# Patient Record
Sex: FEMALE | Race: WHITE | NOT HISPANIC OR LATINO | URBAN - METROPOLITAN AREA
[De-identification: names, ages, dates, MRNs, and addresses within clinical notes are randomized per-mention and may not be internally consistent; named-entity substitution may affect disease eponyms.]

---

## 2020-09-04 ENCOUNTER — TELEPHONE ENCOUNTER (OUTPATIENT)
Dept: URBAN - METROPOLITAN AREA CLINIC 92 | Facility: CLINIC | Age: 60
End: 2020-09-04

## 2020-09-14 ENCOUNTER — WEB ENCOUNTER (OUTPATIENT)
Dept: URBAN - NONMETROPOLITAN AREA CLINIC 2 | Facility: CLINIC | Age: 60
End: 2020-09-14

## 2020-09-14 ENCOUNTER — OFFICE VISIT (OUTPATIENT)
Dept: URBAN - NONMETROPOLITAN AREA CLINIC 2 | Facility: CLINIC | Age: 60
End: 2020-09-14
Payer: COMMERCIAL

## 2020-09-14 DIAGNOSIS — Z12.11 COLON CANCER SCREENING: ICD-10-CM

## 2020-09-14 DIAGNOSIS — K57.92 DIVERTICULITIS: ICD-10-CM

## 2020-09-14 PROCEDURE — 3017F COLORECTAL CA SCREEN DOC REV: CPT | Performed by: NURSE PRACTITIONER

## 2020-09-14 PROCEDURE — 1036F TOBACCO NON-USER: CPT | Performed by: NURSE PRACTITIONER

## 2020-09-14 PROCEDURE — 99213 OFFICE O/P EST LOW 20 MIN: CPT | Performed by: NURSE PRACTITIONER

## 2020-09-14 PROCEDURE — G8427 DOCREV CUR MEDS BY ELIG CLIN: HCPCS | Performed by: NURSE PRACTITIONER

## 2020-09-14 RX ORDER — GLUCOSAMINE SULFATE 500 MG
TABLET ORAL
Qty: 0 | Refills: 0 | Status: ACTIVE | COMMUNITY
Start: 1900-01-01

## 2020-09-14 NOTE — HPI-TODAY'S VISIT:
Susi presents for follow-up of diverticulitis and rectal bleeding.  The first week in September she developed lower abdominal pain with pressure and cramping similar to previous episodes of diverticulitis in the past.  She states that her bowels were somewhat loose, and she saw bright red blood when wiping.  She felt like this was a typical diverticulitis flare for her.  She went to see Dr. De Los Santos who prescribed Flagyl and Cipro for 10 days.  She took this for 7 days and was not able to complete the last 3.  Today she is doing better, she has had no further pain.  Her bowels are somewhat normal, with no further bright red blood per rectum.  Her last documented episode of diverticulitis was in 2015 with slight wall thickening of the mid proximal sigmoid colon with subtle stranding of adjacent fat.  Her last colonoscopy was in 2018 by Dr. Kendrick, this reveals left-sided diverticular disease.  Today she is feeling much better, her appetite has returned, her abdominal pain has resolved, and her diet is back to normal.  Otherwise she is doing well with no new GI complaints.  MB

## 2020-10-27 ENCOUNTER — OFFICE VISIT (OUTPATIENT)
Dept: URBAN - NONMETROPOLITAN AREA CLINIC 2 | Facility: CLINIC | Age: 60
End: 2020-10-27

## 2020-12-01 ENCOUNTER — OFFICE VISIT (OUTPATIENT)
Dept: URBAN - NONMETROPOLITAN AREA CLINIC 2 | Facility: CLINIC | Age: 60
End: 2020-12-01
Payer: COMMERCIAL

## 2020-12-01 VITALS
HEIGHT: 63 IN | WEIGHT: 143 LBS | TEMPERATURE: 98.1 F | DIASTOLIC BLOOD PRESSURE: 84 MMHG | BODY MASS INDEX: 25.34 KG/M2 | HEART RATE: 54 BPM | SYSTOLIC BLOOD PRESSURE: 128 MMHG

## 2020-12-01 DIAGNOSIS — Z12.11 COLON CANCER SCREENING: ICD-10-CM

## 2020-12-01 DIAGNOSIS — K57.92 DIVERTICULITIS: ICD-10-CM

## 2020-12-01 PROCEDURE — G8482 FLU IMMUNIZE ORDER/ADMIN: HCPCS | Performed by: NURSE PRACTITIONER

## 2020-12-01 PROCEDURE — 3017F COLORECTAL CA SCREEN DOC REV: CPT | Performed by: NURSE PRACTITIONER

## 2020-12-01 PROCEDURE — 1036F TOBACCO NON-USER: CPT | Performed by: NURSE PRACTITIONER

## 2020-12-01 PROCEDURE — 99213 OFFICE O/P EST LOW 20 MIN: CPT | Performed by: NURSE PRACTITIONER

## 2020-12-01 PROCEDURE — G8420 CALC BMI NORM PARAMETERS: HCPCS | Performed by: NURSE PRACTITIONER

## 2020-12-01 PROCEDURE — G8427 DOCREV CUR MEDS BY ELIG CLIN: HCPCS | Performed by: NURSE PRACTITIONER

## 2020-12-01 RX ORDER — GLUCOSAMINE SULFATE 500 MG
TABLET ORAL
Qty: 0 | Refills: 0 | Status: ACTIVE | COMMUNITY
Start: 1900-01-01

## 2020-12-01 NOTE — HPI-TODAY'S VISIT:
Susi presents for follow-up of diverticulitis and rectal bleeding.  The first week in September she developed lower abdominal pain with pressure and cramping similar to previous episodes of diverticulitis in the past.  She states that her bowels were somewhat loose, and she saw bright red blood when wiping.  She felt like this was a typical diverticulitis flare for her.  She went to see Dr. De Los Santos who prescribed Flagyl and Cipro for 10 days.  She took this for 7 days and was not able to complete the last 3.  Today she is doing better, she has had no further pain.  Her bowels are somewhat normal, with no further bright red blood per rectum.  Her last documented episode of diverticulitis was in 2015 with slight wall thickening of the mid proximal sigmoid colon with subtle stranding of adjacent fat.  Her last colonoscopy was in 2018 by Dr. Kendrick, this reveals left-sided diverticular disease.  Today she is feeling much better, her appetite has returned, her abdominal pain has resolved, and her diet is back to normal.  Otherwise she is doing well with no new GI complaints.  MB   12/1/2020 ARIANNE presents for follow-up of diverticulitis.  She is doing much better after completing her antibiotics.  She is started low-dose psyllium fiber capsules at night with a normal bowel movement daily.  She occasionally has pain and spasm in her left lower quadrant but no further flares.  Her last colonoscopy was in 2018 with diverticulosis.  Today she is doing well otherwise. MB

## 2021-04-27 ENCOUNTER — OFFICE VISIT (OUTPATIENT)
Dept: URBAN - NONMETROPOLITAN AREA CLINIC 2 | Facility: CLINIC | Age: 61
End: 2021-04-27
Payer: COMMERCIAL

## 2021-04-27 DIAGNOSIS — Z12.11 COLON CANCER SCREENING: ICD-10-CM

## 2021-04-27 DIAGNOSIS — K57.92 DIVERTICULITIS: ICD-10-CM

## 2021-04-27 DIAGNOSIS — K59.00 CONSTIPATION, UNSPECIFIED CONSTIPATION TYPE: ICD-10-CM

## 2021-04-27 DIAGNOSIS — R10.32 LLQ ABDOMINAL PAIN: ICD-10-CM

## 2021-04-27 PROCEDURE — 99214 OFFICE O/P EST MOD 30 MIN: CPT | Performed by: INTERNAL MEDICINE

## 2021-04-27 RX ORDER — GLUCOSAMINE SULFATE 500 MG
TABLET ORAL
Qty: 0 | Refills: 0 | Status: ACTIVE | COMMUNITY
Start: 1900-01-01

## 2021-04-27 RX ORDER — HYOSCYAMINE SULFATE 0.12 MG/1
1 TABLET UNDER THE TONGUE AND ALLOW TO DISSOLVE  AS NEEDED TABLET, ORALLY DISINTEGRATING ORAL THREE TIMES A DAY
Qty: 30 TABLET | Refills: 3 | OUTPATIENT
Start: 2021-04-27 | End: 2021-08-25

## 2021-10-26 ENCOUNTER — OFFICE VISIT (OUTPATIENT)
Dept: URBAN - NONMETROPOLITAN AREA CLINIC 2 | Facility: CLINIC | Age: 61
End: 2021-10-26

## 2022-02-25 ENCOUNTER — WEB ENCOUNTER (OUTPATIENT)
Dept: URBAN - NONMETROPOLITAN AREA CLINIC 13 | Facility: CLINIC | Age: 62
End: 2022-02-25

## 2022-02-25 ENCOUNTER — OFFICE VISIT (OUTPATIENT)
Dept: URBAN - NONMETROPOLITAN AREA CLINIC 13 | Facility: CLINIC | Age: 62
End: 2022-02-25
Payer: COMMERCIAL

## 2022-02-25 VITALS
SYSTOLIC BLOOD PRESSURE: 116 MMHG | HEART RATE: 60 BPM | HEIGHT: 63 IN | DIASTOLIC BLOOD PRESSURE: 79 MMHG | WEIGHT: 148.6 LBS | BODY MASS INDEX: 26.33 KG/M2

## 2022-02-25 DIAGNOSIS — K59.00 CONSTIPATION, UNSPECIFIED CONSTIPATION TYPE: ICD-10-CM

## 2022-02-25 DIAGNOSIS — R10.32 LLQ ABDOMINAL PAIN: ICD-10-CM

## 2022-02-25 DIAGNOSIS — Z12.11 COLON CANCER SCREENING: ICD-10-CM

## 2022-02-25 DIAGNOSIS — K57.92 DIVERTICULITIS: ICD-10-CM

## 2022-02-25 PROBLEM — 301716002: Status: ACTIVE | Noted: 2021-04-27

## 2022-02-25 PROCEDURE — 99214 OFFICE O/P EST MOD 30 MIN: CPT | Performed by: INTERNAL MEDICINE

## 2022-02-25 RX ORDER — GLUCOSAMINE SULFATE 500 MG
TABLET ORAL
Qty: 0 | Refills: 0 | Status: ACTIVE | COMMUNITY
Start: 1900-01-01

## 2022-04-29 ENCOUNTER — ERX REFILL RESPONSE (OUTPATIENT)
Dept: URBAN - NONMETROPOLITAN AREA CLINIC 2 | Facility: CLINIC | Age: 62
End: 2022-04-29

## 2022-04-29 RX ORDER — HYOSCYAMINE SULFATE 0.12 MG/1
PLACE 1 TABLET UNDER THE TONGUE AND ALLOW IT TO DISSOLVE AS NEEDED THREE TIMES A DAY TABLET ORAL
Qty: 30 TABLET | Refills: 4 | OUTPATIENT

## 2023-01-17 ENCOUNTER — TELEPHONE ENCOUNTER (OUTPATIENT)
Dept: URBAN - NONMETROPOLITAN AREA CLINIC 2 | Facility: CLINIC | Age: 63
End: 2023-01-17

## 2023-01-17 RX ORDER — GLUCOSAMINE SULFATE 500 MG
TABLET ORAL
Qty: 0 | Refills: 0 | Status: ACTIVE | COMMUNITY
Start: 1900-01-01

## 2023-01-17 RX ORDER — CIPROFLOXACIN HYDROCHLORIDE 500 MG/1
1 TABLET TABLET, FILM COATED ORAL
Qty: 28 TABLET | Refills: 0 | OUTPATIENT
Start: 2023-01-17 | End: 2023-01-31

## 2023-01-17 RX ORDER — METRONIDAZOLE 500 MG/1
1 TABLET TABLET ORAL THREE TIMES A DAY
Qty: 42 TABLET | Refills: 0 | OUTPATIENT
Start: 2023-01-17 | End: 2023-01-31

## 2023-01-17 RX ORDER — HYOSCYAMINE SULFATE 0.12 MG/1
PLACE 1 TABLET UNDER THE TONGUE AND ALLOW IT TO DISSOLVE AS NEEDED THREE TIMES A DAY TABLET ORAL
Qty: 30 TABLET | Refills: 4 | Status: ACTIVE | COMMUNITY

## 2023-01-23 ENCOUNTER — TELEPHONE ENCOUNTER (OUTPATIENT)
Dept: URBAN - NONMETROPOLITAN AREA CLINIC 2 | Facility: CLINIC | Age: 63
End: 2023-01-23

## 2023-01-27 LAB
A/G RATIO: 1.8
ALBUMIN: 4.7
ALKALINE PHOSPHATASE: 70
ALT (SGPT): 12
AST (SGOT): 18
BASO (ABSOLUTE): 0
BASOS: 1
BILIRUBIN, TOTAL: 0.3
BUN/CREATININE RATIO: 18
BUN: 12
C-REACTIVE PROTEIN, QUANT: 7
CALCIUM: 9.7
CARBON DIOXIDE, TOTAL: 21
CHLORIDE: 100
CREATININE: 0.66
EBV AB VCA, IGG: >600
EBV AB VCA, IGM: <36
EBV NUCLEAR ANTIGEN AB, IGG: 93.5
EGFR: 99
EOS (ABSOLUTE): 0.2
EOS: 3
GLOBULIN, TOTAL: 2.6
GLUCOSE: 106
HEMATOCRIT: 46.9
HEMATOLOGY COMMENTS:: (no result)
HEMOGLOBIN: 15.7
IMMATURE CELLS: (no result)
IMMATURE GRANS (ABS): 0
IMMATURE GRANULOCYTES: 0
INTERPRETATION:: (no result)
LYMPHS (ABSOLUTE): 1.1
LYMPHS: 18
MCH: 31.3
MCHC: 33.5
MCV: 93
MONOCYTES(ABSOLUTE): 0.6
MONOCYTES: 10
NEUTROPHILS (ABSOLUTE): 4.2
NEUTROPHILS: 68
NRBC: (no result)
PLATELETS: 218
POTASSIUM: 4.8
PROTEIN, TOTAL: 7.3
RBC: 5.02
RDW: 12.3
SODIUM: 143
WBC: 6.2

## 2023-01-30 ENCOUNTER — TELEPHONE ENCOUNTER (OUTPATIENT)
Dept: URBAN - METROPOLITAN AREA CLINIC 92 | Facility: CLINIC | Age: 63
End: 2023-01-30

## 2023-02-24 ENCOUNTER — OFFICE VISIT (OUTPATIENT)
Dept: URBAN - NONMETROPOLITAN AREA CLINIC 2 | Facility: CLINIC | Age: 63
End: 2023-02-24

## 2023-05-16 ENCOUNTER — WEB ENCOUNTER (OUTPATIENT)
Dept: URBAN - NONMETROPOLITAN AREA CLINIC 2 | Facility: CLINIC | Age: 63
End: 2023-05-16

## 2023-05-16 ENCOUNTER — OFFICE VISIT (OUTPATIENT)
Dept: URBAN - NONMETROPOLITAN AREA CLINIC 2 | Facility: CLINIC | Age: 63
End: 2023-05-16
Payer: COMMERCIAL

## 2023-05-16 VITALS
HEIGHT: 63 IN | WEIGHT: 145 LBS | BODY MASS INDEX: 25.69 KG/M2 | HEART RATE: 66 BPM | DIASTOLIC BLOOD PRESSURE: 93 MMHG | SYSTOLIC BLOOD PRESSURE: 139 MMHG

## 2023-05-16 DIAGNOSIS — Z12.11 COLON CANCER SCREENING: ICD-10-CM

## 2023-05-16 DIAGNOSIS — K59.01 CONSTIPATION: ICD-10-CM

## 2023-05-16 DIAGNOSIS — K57.92 DIVERTICULITIS: ICD-10-CM

## 2023-05-16 DIAGNOSIS — R10.32 LLQ ABDOMINAL PAIN: ICD-10-CM

## 2023-05-16 PROCEDURE — 99214 OFFICE O/P EST MOD 30 MIN: CPT | Performed by: INTERNAL MEDICINE

## 2023-05-16 RX ORDER — GLUCOSAMINE SULFATE 500 MG
TABLET ORAL
Qty: 0 | Refills: 0 | Status: ACTIVE | COMMUNITY
Start: 1900-01-01

## 2023-05-16 RX ORDER — HYOSCYAMINE SULFATE 0.12 MG/1
PLACE 1 TABLET UNDER THE TONGUE AND ALLOW IT TO DISSOLVE AS NEEDED THREE TIMES A DAY TABLET ORAL
Qty: 30 TABLET | Refills: 4 | Status: ACTIVE | COMMUNITY

## 2023-05-16 NOTE — HPI-TODAY'S VISIT:
Susi presents for follow-up of diverticulitis and rectal bleeding.  The first week in September she developed lower abdominal pain with pressure and cramping similar to previous episodes of diverticulitis in the past.  She states that her bowels were somewhat loose, and she saw bright red blood when wiping.  She felt like this was a typical diverticulitis flare for her.  She went to see Dr. De Los Santos who prescribed Flagyl and Cipro for 10 days.  She took this for 7 days and was not able to complete the last 3.  Today she is doing better, she has had no further pain.  Her bowels are somewhat normal, with no further bright red blood per rectum.  Her last documented episode of diverticulitis was in 2015 with slight wall thickening of the mid proximal sigmoid colon with subtle stranding of adjacent fat.  Her last colonoscopy was in 2018 by Dr. Kendrick, this reveals left-sided diverticular disease.  Today she is feeling much better, her appetite has returned, her abdominal pain has resolved, and her diet is back to normal.  Otherwise she is doing well with no new GI complaints.  MB   12/1/2020 ARIANNE presents for follow-up of diverticulitis.  She is doing much better after completing her antibiotics.  She is started low-dose psyllium fiber capsules at night with a normal bowel movement daily.  She occasionally has pain and spasm in her left lower quadrant but no further flares.  Her last colonoscopy was in 2018 with diverticulosis.  Today she is doing well otherwise. MB 4-27-21 The patient presents today for follow-up of left lower quadrant abdominal pain.  She does have a history of diverticulitis.  She feels that over the past several months, she has had 2 flares of left lower quadrant abdominal pain.  She has not been drinking as much water as she has been previously, and she has not been exercising as much.  She does not feel constipated, but she does think her bowels have changed.  When she feels this pain, she goes on a clear liquid diet, and her pain usually goes away.  She has not had fever or chills, and she has not required another episode of antibiotics.  She has not seen any blood in her stool.  We have had a discussion today that this likely represents spasm from her diverticular disease along with possible scar tissue.  She has been taking her psyllium on a daily basis, but she is not taking MiraLAX daily.  We have discussed adding this in on a more regular basis.  She is to call us if she has a flare of what appears to be diverticulitis that requires antibiotics. 2/25/2022 The patient presents today for follow-up of her history of diverticulitis.  Since her last visit, she has been doing quite well.  She does occasionally have some left lower quadrant abdominal pain, but this is controlled with using Levsin or high Cosamin as needed she is taking her bowel regimen with Metamucil and MiraLAX, and her bowels have been normal quite regular.  She has not had any further flares of diverticulitis.  Overall, from a GI standpoint she is doing quite well.  We will see her back in 1 year. 5/16/2023 The patient presents today for follow-up of her diverticulitis and left lower quadrant abdominal pain along with constipation.  Since her last visit, she did have a flare of diverticulitis.  This happened about 3 to 4 months ago.  She started developing left lower quadrant abdominal pain.  She went on a bland diet.  We did call her in antibiotics, but she did not have to take them.  We also checked her labs, and these were all normal.  Today, we have discussed her fiber intake.  She is taking a fiber gummy, but not Metamucil or MiraLAX.  Today, we have discussed switching back to Metamucil and taking MiraLAX as needed.  She feels that she does not need anything extra right now for her bowels.  She has not due for repeat colonoscopy in 2028.  I do want her to come back in 4 to 6 months to make sure she has not had any further flares, and to make sure her bowels have returned to normal.  Otherwise, I would consider doing colonoscopy sooner.  We will see her back in the office in 4 to 6 months.

## 2023-10-08 ENCOUNTER — ERX REFILL RESPONSE (OUTPATIENT)
Dept: URBAN - NONMETROPOLITAN AREA CLINIC 2 | Facility: CLINIC | Age: 63
End: 2023-10-08

## 2023-10-08 RX ORDER — HYOSCYAMINE SULFATE 0.12 MG/1
PLACE 1 TABLET UNDER THE TONGUE AND ALLOW IT TO DISSOLVE AS NEEDED THREE TIMES A DAY TABLET ORAL
Qty: 30 TABLET | Refills: 11 | OUTPATIENT

## 2023-10-08 RX ORDER — HYOSCYAMINE SULFATE 0.12 MG/1
PLACE 1 TABLET UNDER THE TONGUE AND ALLOW IT TO DISSOLVE AS NEEDED THREE TIMES A DAY TABLET ORAL
Qty: 30 TABLET | Refills: 1 | OUTPATIENT

## 2023-10-24 ENCOUNTER — CLAIMS CREATED FROM THE CLAIM WINDOW (OUTPATIENT)
Dept: URBAN - NONMETROPOLITAN AREA CLINIC 2 | Facility: CLINIC | Age: 63
End: 2023-10-24
Payer: COMMERCIAL

## 2023-10-24 ENCOUNTER — DASHBOARD ENCOUNTERS (OUTPATIENT)
Age: 63
End: 2023-10-24

## 2023-10-24 VITALS
WEIGHT: 145 LBS | HEIGHT: 63 IN | HEART RATE: 60 BPM | BODY MASS INDEX: 25.69 KG/M2 | DIASTOLIC BLOOD PRESSURE: 85 MMHG | SYSTOLIC BLOOD PRESSURE: 122 MMHG

## 2023-10-24 DIAGNOSIS — K59.00 CONSTIPATION, UNSPECIFIED CONSTIPATION TYPE: ICD-10-CM

## 2023-10-24 DIAGNOSIS — R10.32 LLQ ABDOMINAL PAIN: ICD-10-CM

## 2023-10-24 DIAGNOSIS — K57.92 DIVERTICULITIS: ICD-10-CM

## 2023-10-24 DIAGNOSIS — Z12.11 COLON CANCER SCREENING: ICD-10-CM

## 2023-10-24 DIAGNOSIS — K59.09 CHANGE IN BOWEL MOVEMENTS INTERMITTENT CONSTIPATION. URGENCY IN THE MORNING.: ICD-10-CM

## 2023-10-24 PROBLEM — 14760008: Status: ACTIVE | Noted: 2021-04-27

## 2023-10-24 PROCEDURE — 99212 OFFICE O/P EST SF 10 MIN: CPT

## 2023-10-24 RX ORDER — GLUCOSAMINE SULFATE 500 MG
TABLET ORAL
Qty: 0 | Refills: 0 | Status: ACTIVE | COMMUNITY
Start: 1900-01-01

## 2023-10-24 RX ORDER — HYOSCYAMINE SULFATE 0.12 MG/1
PLACE 1 TABLET UNDER THE TONGUE AND ALLOW IT TO DISSOLVE AS NEEDED THREE TIMES A DAY TABLET ORAL
Qty: 30 TABLET | Refills: 11 | Status: ACTIVE | COMMUNITY

## 2023-10-24 NOTE — HPI-TODAY'S VISIT:
Ssui presents for follow-up of diverticulitis and rectal bleeding.  The first week in September she developed lower abdominal pain with pressure and cramping similar to previous episodes of diverticulitis in the past.  She states that her bowels were somewhat loose, and she saw bright red blood when wiping.  She felt like this was a typical diverticulitis flare for her.  She went to see Dr. De Los Santos who prescribed Flagyl and Cipro for 10 days.  She took this for 7 days and was not able to complete the last 3.  Today she is doing better, she has had no further pain.  Her bowels are somewhat normal, with no further bright red blood per rectum.  Her last documented episode of diverticulitis was in 2015 with slight wall thickening of the mid proximal sigmoid colon with subtle stranding of adjacent fat.  Her last colonoscopy was in 2018 by Dr. Kendrick, this reveals left-sided diverticular disease.  Today she is feeling much better, her appetite has returned, her abdominal pain has resolved, and her diet is back to normal.  Otherwise she is doing well with no new GI complaints.  MB   12/1/2020 ARIANNE presents for follow-up of diverticulitis.  She is doing much better after completing her antibiotics.  She is started low-dose psyllium fiber capsules at night with a normal bowel movement daily.  She occasionally has pain and spasm in her left lower quadrant but no further flares.  Her last colonoscopy was in 2018 with diverticulosis.  Today she is doing well otherwise. MB 4-27-21 The patient presents today for follow-up of left lower quadrant abdominal pain.  She does have a history of diverticulitis.  She feels that over the past several months, she has had 2 flares of left lower quadrant abdominal pain.  She has not been drinking as much water as she has been previously, and she has not been exercising as much.  She does not feel constipated, but she does think her bowels have changed.  When she feels this pain, she goes on a clear liquid diet, and her pain usually goes away.  She has not had fever or chills, and she has not required another episode of antibiotics.  She has not seen any blood in her stool.  We have had a discussion today that this likely represents spasm from her diverticular disease along with possible scar tissue.  She has been taking her psyllium on a daily basis, but she is not taking MiraLAX daily.  We have discussed adding this in on a more regular basis.  She is to call us if she has a flare of what appears to be diverticulitis that requires antibiotics. 2/25/2022 The patient presents today for follow-up of her history of diverticulitis.  Since her last visit, she has been doing quite well.  She does occasionally have some left lower quadrant abdominal pain, but this is controlled with using Levsin or high Cosamin as needed she is taking her bowel regimen with Metamucil and MiraLAX, and her bowels have been normal quite regular.  She has not had any further flares of diverticulitis.  Overall, from a GI standpoint she is doing quite well.  We will see her back in 1 year. 5/16/2023 The patient presents today for follow-up of her diverticulitis and left lower quadrant abdominal pain along with constipation.  Since her last visit, she did have a flare of diverticulitis.  This happened about 3 to 4 months ago.  She started developing left lower quadrant abdominal pain.  She went on a bland diet.  We did call her in antibiotics, but she did not have to take them.  We also checked her labs, and these were all normal.  Today, we have discussed her fiber intake.  She is taking a fiber gummy, but not Metamucil or MiraLAX.  Today, we have discussed switching back to Metamucil and taking MiraLAX as needed.  She feels that she does not need anything extra right now for her bowels.  She has not due for repeat colonoscopy in 2028.  I do want her to come back in 4 to 6 months to make sure she has not had any further flares, and to make sure her bowels have returned to normal.  Otherwise, I would consider doing colonoscopy sooner.  We will see her back in the office in 4 to 6 months.  10/24/2023 Deirdre Dunphy returns for followup with history of diverticulitis. Today she states the changes implemented to taking psyllium fiber daily has held her steady, keeps a supply of Levsin but has not had to use this. She has not had any further flares and understands to contact us if this occurs as we would repeat her colonoscopy . She feels she is maintaining well. Overall she is doing well and has no interval medical changes. She will return in 1 year. LUIS

## 2024-10-24 ENCOUNTER — OFFICE VISIT (OUTPATIENT)
Dept: URBAN - NONMETROPOLITAN AREA CLINIC 2 | Facility: CLINIC | Age: 64
End: 2024-10-24

## 2024-12-11 ENCOUNTER — OFFICE VISIT (OUTPATIENT)
Dept: URBAN - NONMETROPOLITAN AREA CLINIC 2 | Facility: CLINIC | Age: 64
End: 2024-12-11
Payer: MEDICARE

## 2024-12-11 ENCOUNTER — LAB OUTSIDE AN ENCOUNTER (OUTPATIENT)
Dept: URBAN - NONMETROPOLITAN AREA CLINIC 2 | Facility: CLINIC | Age: 64
End: 2024-12-11

## 2024-12-11 VITALS
DIASTOLIC BLOOD PRESSURE: 74 MMHG | WEIGHT: 155.2 LBS | SYSTOLIC BLOOD PRESSURE: 108 MMHG | HEIGHT: 63 IN | HEART RATE: 76 BPM | BODY MASS INDEX: 27.5 KG/M2

## 2024-12-11 DIAGNOSIS — Z12.11 COLON CANCER SCREENING: ICD-10-CM

## 2024-12-11 DIAGNOSIS — K57.92 DIVERTICULITIS: ICD-10-CM

## 2024-12-11 DIAGNOSIS — K59.00 CONSTIPATION, UNSPECIFIED CONSTIPATION TYPE: ICD-10-CM

## 2024-12-11 DIAGNOSIS — R10.32 LLQ ABDOMINAL PAIN: ICD-10-CM

## 2024-12-11 PROCEDURE — 99214 OFFICE O/P EST MOD 30 MIN: CPT

## 2024-12-11 RX ORDER — HYOSCYAMINE SULFATE 0.12 MG/1
1 TABLET AS NEEDED TABLET ORAL
Qty: 60 TABLET | Refills: 5 | OUTPATIENT
Start: 2024-12-11 | End: 2025-02-09

## 2024-12-11 RX ORDER — LISINOPRIL 10 MG/1
TABLET ORAL
Qty: 30 TABLET | Status: ACTIVE | COMMUNITY

## 2024-12-11 RX ORDER — ROSUVASTATIN 20 MG/1
TABLET, FILM COATED ORAL
Qty: 30 TABLET | Status: ACTIVE | COMMUNITY

## 2024-12-11 NOTE — HPI-TODAY'S VISIT:
12/1/22024 Susi returns for follow-up with history of diverticulitis.  She was doing well for 2 years however recently feels she had another mild flare.  This was over weeks ago and has now resolved.  She feels she managed well with diet changes and only had mild symptoms with a lower abdominal heaviness.  During that time she did not tolerate psyllium fiber.  She is only taking MiraLAX as needed.  She feels hyoscyamine is helpful for her spasm discomfort but has run out of this medication.  Today she presents to schedule her repeat colonoscopy and obtain refills. She is no longer having abdominal pain, denies fever and chills. Her bowels are moving regularly without blood. Her way to stable. She has any upper G.I. symptoms, including heartburn reflux dysphasia. We will schedule her repeat colonoscopy today with recent flare and she agrees to restart psyllium fiber advance as tolerated. LUIS

## 2025-02-19 ENCOUNTER — OFFICE VISIT (OUTPATIENT)
Dept: URBAN - NONMETROPOLITAN AREA SURGERY CENTER 1 | Facility: SURGERY CENTER | Age: 65
End: 2025-02-19

## 2025-04-02 ENCOUNTER — OFFICE VISIT (OUTPATIENT)
Dept: URBAN - NONMETROPOLITAN AREA CLINIC 2 | Facility: CLINIC | Age: 65
End: 2025-04-02

## 2025-04-04 ENCOUNTER — CLAIMS CREATED FROM THE CLAIM WINDOW (OUTPATIENT)
Dept: URBAN - NONMETROPOLITAN AREA SURGERY CENTER 1 | Facility: SURGERY CENTER | Age: 65
End: 2025-04-04
Payer: MEDICARE

## 2025-04-04 ENCOUNTER — CLAIMS CREATED FROM THE CLAIM WINDOW (OUTPATIENT)
Dept: URBAN - METROPOLITAN AREA CLINIC 4 | Facility: CLINIC | Age: 65
End: 2025-04-04
Payer: MEDICARE

## 2025-04-04 DIAGNOSIS — K63.5 POLYP OF COLON: ICD-10-CM

## 2025-04-04 DIAGNOSIS — Z12.11 COLON CANCER SCREENING: ICD-10-CM

## 2025-04-04 DIAGNOSIS — K63.5 POLYP OF ASCENDING COLON: ICD-10-CM

## 2025-04-04 DIAGNOSIS — K62.1 HYPERPLASTIC RECTAL POLYP: ICD-10-CM

## 2025-04-04 DIAGNOSIS — K63.89 OTHER SPECIFIED DISEASES OF INTESTINE: ICD-10-CM

## 2025-04-04 DIAGNOSIS — K62.1 RECTAL POLYP: ICD-10-CM

## 2025-04-04 DIAGNOSIS — K62.1 POLYP OF RECTUM: ICD-10-CM

## 2025-04-04 DIAGNOSIS — Z12.11 ENCOUNTER SCREENING FOR MALIGNANT NEOPLASM OF COLON: ICD-10-CM

## 2025-04-04 PROCEDURE — 45385 COLONOSCOPY W/LESION REMOVAL: CPT | Performed by: CLINIC/CENTER

## 2025-04-04 PROCEDURE — 45385 COLONOSCOPY W/LESION REMOVAL: CPT | Performed by: INTERNAL MEDICINE

## 2025-04-04 PROCEDURE — 00811 ANES LWR INTST NDSC NOS: CPT | Performed by: NURSE ANESTHETIST, CERTIFIED REGISTERED

## 2025-04-04 PROCEDURE — 88305 TISSUE EXAM BY PATHOLOGIST: CPT | Performed by: PATHOLOGY

## 2025-04-04 RX ORDER — ROSUVASTATIN 20 MG/1
TABLET, FILM COATED ORAL
Qty: 30 TABLET | Status: ACTIVE | COMMUNITY

## 2025-04-04 RX ORDER — LISINOPRIL 10 MG/1
TABLET ORAL
Qty: 30 TABLET | Status: ACTIVE | COMMUNITY